# Patient Record
Sex: FEMALE | Race: WHITE | URBAN - METROPOLITAN AREA
[De-identification: names, ages, dates, MRNs, and addresses within clinical notes are randomized per-mention and may not be internally consistent; named-entity substitution may affect disease eponyms.]

---

## 2020-01-27 ENCOUNTER — IMPORTED ENCOUNTER (OUTPATIENT)
Dept: URBAN - METROPOLITAN AREA CLINIC 50 | Facility: CLINIC | Age: 40
End: 2020-01-27

## 2020-01-27 NOTE — PATIENT DISCUSSION
"""Discussed excision of leison RLL. Questions answered. Patient denies taking any blood thinners.  """

## 2020-03-02 ENCOUNTER — IMPORTED ENCOUNTER (OUTPATIENT)
Dept: URBAN - METROPOLITAN AREA CLINIC 50 | Facility: CLINIC | Age: 40
End: 2020-03-02

## 2021-04-17 ASSESSMENT — VISUAL ACUITY
OD_SC: 20/20
OD_CC: J1+
OS_CC: J1+
OS_SC: 20/20
OD_SC: 20/20
OS_SC: 20/20

## 2021-04-17 ASSESSMENT — TONOMETRY
OS_IOP_MMHG: 16
OD_IOP_MMHG: 16
OD_IOP_MMHG: 16
OS_IOP_MMHG: 15